# Patient Record
Sex: MALE | Race: BLACK OR AFRICAN AMERICAN | NOT HISPANIC OR LATINO | Employment: UNEMPLOYED | URBAN - NONMETROPOLITAN AREA
[De-identification: names, ages, dates, MRNs, and addresses within clinical notes are randomized per-mention and may not be internally consistent; named-entity substitution may affect disease eponyms.]

---

## 2019-10-18 ENCOUNTER — HOSPITAL ENCOUNTER (INPATIENT)
Facility: HOSPITAL | Age: 29
LOS: 1 days | Discharge: HOME | End: 2019-10-20
Attending: INTERNAL MEDICINE | Admitting: FAMILY MEDICINE

## 2019-10-18 DIAGNOSIS — R41.82 ALTERED MENTAL STATUS, UNSPECIFIED ALTERED MENTAL STATUS TYPE: Primary | ICD-10-CM

## 2019-10-18 DIAGNOSIS — R13.10 DYSPHAGIA, UNSPECIFIED TYPE: ICD-10-CM

## 2019-10-18 DIAGNOSIS — M62.82 NON-TRAUMATIC RHABDOMYOLYSIS: ICD-10-CM

## 2019-10-18 LAB
ALBUMIN SERPL-MCNC: 4.4 G/DL (ref 3.5–5.2)
ALBUMIN/GLOB SERPL: 1.8 G/DL
ALP SERPL-CCNC: 47 U/L (ref 39–117)
ALT SERPL W P-5'-P-CCNC: 18 U/L (ref 1–41)
AMPHET+METHAMPHET UR QL: NEGATIVE
AMPHETAMINES UR QL: NEGATIVE
ANION GAP SERPL CALCULATED.3IONS-SCNC: 18 MMOL/L (ref 5–15)
ARTERIAL PATENCY WRIST A: POSITIVE
AST SERPL-CCNC: 36 U/L (ref 1–40)
ATMOSPHERIC PRESS: 748 MMHG
BARBITURATES UR QL SCN: NEGATIVE
BASE EXCESS BLDA CALC-SCNC: -1.3 MMOL/L (ref 0–2)
BASOPHILS # BLD AUTO: 0.03 10*3/MM3 (ref 0–0.2)
BASOPHILS NFR BLD AUTO: 0.5 % (ref 0–1.5)
BDY SITE: ABNORMAL
BENZODIAZ UR QL SCN: NEGATIVE
BILIRUB SERPL-MCNC: 0.5 MG/DL (ref 0.2–1.2)
BILIRUB UR QL STRIP: NEGATIVE
BODY TEMPERATURE: 37 C
BUN BLD-MCNC: 15 MG/DL (ref 6–20)
BUN/CREAT SERPL: 10.1 (ref 7–25)
BUPRENORPHINE SERPL-MCNC: NEGATIVE NG/ML
CALCIUM SPEC-SCNC: 8.6 MG/DL (ref 8.6–10.5)
CANNABINOIDS SERPL QL: NEGATIVE
CHLORIDE SERPL-SCNC: 100 MMOL/L (ref 98–107)
CK SERPL-CCNC: 1606 U/L (ref 20–200)
CLARITY UR: CLEAR
CO2 SERPL-SCNC: 23 MMOL/L (ref 22–29)
COCAINE UR QL: NEGATIVE
COLOR UR: YELLOW
CREAT BLD-MCNC: 1.49 MG/DL (ref 0.76–1.27)
DEPRECATED RDW RBC AUTO: 36.5 FL (ref 37–54)
EOSINOPHIL # BLD AUTO: 0.17 10*3/MM3 (ref 0–0.4)
EOSINOPHIL NFR BLD AUTO: 2.6 % (ref 0.3–6.2)
ERYTHROCYTE [DISTWIDTH] IN BLOOD BY AUTOMATED COUNT: 12.9 % (ref 12.3–15.4)
ETHANOL UR QL: <0.01 %
GAS FLOW AIRWAY: 15 LPM
GFR SERPL CREATININE-BSD FRML MDRD: 69 ML/MIN/1.73
GLOBULIN UR ELPH-MCNC: 2.5 GM/DL
GLUCOSE BLD-MCNC: 124 MG/DL (ref 65–99)
GLUCOSE UR STRIP-MCNC: NEGATIVE MG/DL
HCO3 BLDA-SCNC: 24.6 MMOL/L (ref 20–26)
HCT VFR BLD AUTO: 35.6 % (ref 37.5–51)
HGB BLD-MCNC: 12.5 G/DL (ref 13–17.7)
HGB UR QL STRIP.AUTO: NEGATIVE
IMM GRANULOCYTES # BLD AUTO: 0.02 10*3/MM3 (ref 0–0.05)
IMM GRANULOCYTES NFR BLD AUTO: 0.3 % (ref 0–0.5)
KETONES UR QL STRIP: NEGATIVE
LEUKOCYTE ESTERASE UR QL STRIP.AUTO: NEGATIVE
LYMPHOCYTES # BLD AUTO: 2.07 10*3/MM3 (ref 0.7–3.1)
LYMPHOCYTES NFR BLD AUTO: 31.6 % (ref 19.6–45.3)
Lab: ABNORMAL
MCH RBC QN AUTO: 27.8 PG (ref 26.6–33)
MCHC RBC AUTO-ENTMCNC: 35.1 G/DL (ref 31.5–35.7)
MCV RBC AUTO: 79.1 FL (ref 79–97)
METHADONE UR QL SCN: NEGATIVE
MODALITY: ABNORMAL
MONOCYTES # BLD AUTO: 0.44 10*3/MM3 (ref 0.1–0.9)
MONOCYTES NFR BLD AUTO: 6.7 % (ref 5–12)
NEUTROPHILS # BLD AUTO: 3.82 10*3/MM3 (ref 1.7–7)
NEUTROPHILS NFR BLD AUTO: 58.3 % (ref 42.7–76)
NITRITE UR QL STRIP: NEGATIVE
NRBC BLD AUTO-RTO: 0 /100 WBC (ref 0–0.2)
OPIATES UR QL: NEGATIVE
OXYCODONE UR QL SCN: NEGATIVE
PCO2 BLDA: 44.4 MM HG (ref 35–45)
PCP UR QL SCN: NEGATIVE
PH BLDA: 7.35 PH UNITS (ref 7.35–7.45)
PH UR STRIP.AUTO: <=5 [PH] (ref 5–8)
PLATELET # BLD AUTO: 218 10*3/MM3 (ref 140–450)
PMV BLD AUTO: 10.8 FL (ref 6–12)
PO2 BLDA: 391 MM HG (ref 83–108)
POTASSIUM BLD-SCNC: 3.7 MMOL/L (ref 3.5–5.2)
PROPOXYPH UR QL: NEGATIVE
PROT SERPL-MCNC: 6.9 G/DL (ref 6–8.5)
PROT UR QL STRIP: NEGATIVE
RBC # BLD AUTO: 4.5 10*6/MM3 (ref 4.14–5.8)
SAO2 % BLDCOA: >100.1 % (ref 94–99)
SODIUM BLD-SCNC: 141 MMOL/L (ref 136–145)
SP GR UR STRIP: 1.01 (ref 1–1.03)
TRICYCLICS UR QL SCN: NEGATIVE
UROBILINOGEN UR QL STRIP: NORMAL
VENTILATOR MODE: ABNORMAL
WBC NRBC COR # BLD: 6.55 10*3/MM3 (ref 3.4–10.8)

## 2019-10-18 PROCEDURE — 80053 COMPREHEN METABOLIC PANEL: CPT | Performed by: INTERNAL MEDICINE

## 2019-10-18 PROCEDURE — 81003 URINALYSIS AUTO W/O SCOPE: CPT | Performed by: INTERNAL MEDICINE

## 2019-10-18 PROCEDURE — 94799 UNLISTED PULMONARY SVC/PX: CPT

## 2019-10-18 PROCEDURE — 82550 ASSAY OF CK (CPK): CPT | Performed by: INTERNAL MEDICINE

## 2019-10-18 PROCEDURE — 80307 DRUG TEST PRSMV CHEM ANLYZR: CPT | Performed by: INTERNAL MEDICINE

## 2019-10-18 PROCEDURE — 25010000002 PROPOFOL 10 MG/ML EMULSION: Performed by: INTERNAL MEDICINE

## 2019-10-18 PROCEDURE — 93005 ELECTROCARDIOGRAM TRACING: CPT | Performed by: INTERNAL MEDICINE

## 2019-10-18 PROCEDURE — 94770: CPT

## 2019-10-18 PROCEDURE — 36600 WITHDRAWAL OF ARTERIAL BLOOD: CPT

## 2019-10-18 PROCEDURE — 93010 ELECTROCARDIOGRAM REPORT: CPT | Performed by: INTERNAL MEDICINE

## 2019-10-18 PROCEDURE — 82803 BLOOD GASES ANY COMBINATION: CPT

## 2019-10-18 PROCEDURE — 85025 COMPLETE CBC W/AUTO DIFF WBC: CPT | Performed by: INTERNAL MEDICINE

## 2019-10-18 PROCEDURE — 99285 EMERGENCY DEPT VISIT HI MDM: CPT

## 2019-10-18 RX ADMIN — PROPOFOL 10 MCG/KG/MIN: 10 INJECTION, EMULSION INTRAVENOUS at 23:46

## 2019-10-18 RX ADMIN — SODIUM CHLORIDE 1000 ML: 9 INJECTION, SOLUTION INTRAVENOUS at 23:23

## 2019-10-18 RX ADMIN — PROPOFOL 5 MCG/KG/MIN: 10 INJECTION, EMULSION INTRAVENOUS at 23:22

## 2019-10-19 ENCOUNTER — APPOINTMENT (OUTPATIENT)
Dept: CT IMAGING | Facility: HOSPITAL | Age: 29
End: 2019-10-19

## 2019-10-19 PROBLEM — T50.901A DRUG OVERDOSE: Status: ACTIVE | Noted: 2019-10-19

## 2019-10-19 PROBLEM — R41.82 ALTERED MENTAL STATUS: Status: ACTIVE | Noted: 2019-10-19

## 2019-10-19 PROBLEM — M62.82 NON-TRAUMATIC RHABDOMYOLYSIS: Status: ACTIVE | Noted: 2019-10-19

## 2019-10-19 LAB
ANION GAP SERPL CALCULATED.3IONS-SCNC: 12 MMOL/L (ref 5–15)
APAP SERPL-MCNC: <5 MCG/ML (ref 10–30)
BUN BLD-MCNC: 11 MG/DL (ref 6–20)
BUN/CREAT SERPL: 8.7 (ref 7–25)
CALCIUM SPEC-SCNC: 8.9 MG/DL (ref 8.6–10.5)
CHLORIDE SERPL-SCNC: 103 MMOL/L (ref 98–107)
CHOLEST SERPL-MCNC: 134 MG/DL (ref 0–200)
CK SERPL-CCNC: 1905 U/L (ref 20–200)
CK SERPL-CCNC: 2654 U/L (ref 20–200)
CO2 SERPL-SCNC: 29 MMOL/L (ref 22–29)
CREAT BLD-MCNC: 1.26 MG/DL (ref 0.76–1.27)
GFR SERPL CREATININE-BSD FRML MDRD: 83 ML/MIN/1.73
GLUCOSE BLD-MCNC: 116 MG/DL (ref 65–99)
HDLC SERPL-MCNC: 61 MG/DL (ref 40–60)
HOLD SPECIMEN: NORMAL
LDLC SERPL CALC-MCNC: 65 MG/DL (ref 0–100)
LDLC/HDLC SERPL: 1.07 {RATIO}
POTASSIUM BLD-SCNC: 3.7 MMOL/L (ref 3.5–5.2)
SALICYLATES SERPL-MCNC: <0.3 MG/DL
SODIUM BLD-SCNC: 144 MMOL/L (ref 136–145)
TRIGL SERPL-MCNC: 39 MG/DL (ref 0–150)
VLDLC SERPL-MCNC: 7.8 MG/DL
WHOLE BLOOD HOLD SPECIMEN: NORMAL
WHOLE BLOOD HOLD SPECIMEN: NORMAL

## 2019-10-19 PROCEDURE — 80048 BASIC METABOLIC PNL TOTAL CA: CPT | Performed by: FAMILY MEDICINE

## 2019-10-19 PROCEDURE — 82550 ASSAY OF CK (CPK): CPT | Performed by: INTERNAL MEDICINE

## 2019-10-19 PROCEDURE — 80307 DRUG TEST PRSMV CHEM ANLYZR: CPT | Performed by: FAMILY MEDICINE

## 2019-10-19 PROCEDURE — 25010000002 PROPOFOL 1000 MG/ML EMULSION: Performed by: INTERNAL MEDICINE

## 2019-10-19 PROCEDURE — 82550 ASSAY OF CK (CPK): CPT | Performed by: FAMILY MEDICINE

## 2019-10-19 PROCEDURE — 92610 EVALUATE SWALLOWING FUNCTION: CPT

## 2019-10-19 PROCEDURE — 80061 LIPID PANEL: CPT | Performed by: FAMILY MEDICINE

## 2019-10-19 PROCEDURE — 94799 UNLISTED PULMONARY SVC/PX: CPT

## 2019-10-19 PROCEDURE — 25010000002 PROPOFOL 10 MG/ML EMULSION: Performed by: INTERNAL MEDICINE

## 2019-10-19 PROCEDURE — 70450 CT HEAD/BRAIN W/O DYE: CPT

## 2019-10-19 RX ORDER — ONDANSETRON 2 MG/ML
4 INJECTION INTRAMUSCULAR; INTRAVENOUS EVERY 6 HOURS PRN
Status: DISCONTINUED | OUTPATIENT
Start: 2019-10-19 | End: 2019-10-20 | Stop reason: HOSPADM

## 2019-10-19 RX ORDER — LORAZEPAM 2 MG/ML
1 INJECTION INTRAMUSCULAR EVERY 4 HOURS PRN
Status: DISCONTINUED | OUTPATIENT
Start: 2019-10-19 | End: 2019-10-20 | Stop reason: HOSPADM

## 2019-10-19 RX ORDER — SODIUM CHLORIDE 0.9 % (FLUSH) 0.9 %
10 SYRINGE (ML) INJECTION AS NEEDED
Status: DISCONTINUED | OUTPATIENT
Start: 2019-10-19 | End: 2019-10-20 | Stop reason: HOSPADM

## 2019-10-19 RX ORDER — SODIUM CHLORIDE 0.9 % (FLUSH) 0.9 %
10 SYRINGE (ML) INJECTION EVERY 12 HOURS SCHEDULED
Status: DISCONTINUED | OUTPATIENT
Start: 2019-10-19 | End: 2019-10-20 | Stop reason: HOSPADM

## 2019-10-19 RX ORDER — ACETAMINOPHEN 325 MG/1
650 TABLET ORAL EVERY 6 HOURS PRN
Status: DISCONTINUED | OUTPATIENT
Start: 2019-10-19 | End: 2019-10-20 | Stop reason: HOSPADM

## 2019-10-19 RX ADMIN — SODIUM CHLORIDE 1000 ML: 9 INJECTION, SOLUTION INTRAVENOUS at 01:01

## 2019-10-19 RX ADMIN — SODIUM BICARBONATE: 84 INJECTION, SOLUTION INTRAVENOUS at 10:51

## 2019-10-19 RX ADMIN — SODIUM CHLORIDE, PRESERVATIVE FREE 10 ML: 5 INJECTION INTRAVENOUS at 10:00

## 2019-10-19 RX ADMIN — ACETAMINOPHEN 650 MG: 325 TABLET, FILM COATED ORAL at 20:54

## 2019-10-19 RX ADMIN — SODIUM CHLORIDE 1000 ML: 9 INJECTION, SOLUTION INTRAVENOUS at 00:20

## 2019-10-19 RX ADMIN — SODIUM CHLORIDE 1000 ML: 9 INJECTION, SOLUTION INTRAVENOUS at 01:45

## 2019-10-19 RX ADMIN — SODIUM BICARBONATE: 84 INJECTION, SOLUTION INTRAVENOUS at 21:02

## 2019-10-19 RX ADMIN — PROPOFOL 45 MCG/KG/MIN: 10 INJECTION, EMULSION INTRAVENOUS at 06:14

## 2019-10-19 RX ADMIN — SODIUM CHLORIDE 1000 ML: 9 INJECTION, SOLUTION INTRAVENOUS at 05:02

## 2019-10-19 RX ADMIN — PROPOFOL 50 MCG/KG/MIN: 10 INJECTION, EMULSION INTRAVENOUS at 03:45

## 2019-10-19 RX ADMIN — SODIUM CHLORIDE 1000 ML: 9 INJECTION, SOLUTION INTRAVENOUS at 03:50

## 2019-10-19 RX ADMIN — SODIUM CHLORIDE, PRESERVATIVE FREE 10 ML: 5 INJECTION INTRAVENOUS at 20:20

## 2019-10-19 NOTE — PLAN OF CARE
Problem: Patient Care Overview  Goal: Plan of Care Review  Outcome: Ongoing (interventions implemented as appropriate)   10/19/19 4900   Coping/Psychosocial   Plan of Care Reviewed With patient   Plan of Care Review   Progress improving   OTHER   Outcome Summary TRANSER FROM UNIT, DRUG OD AND RHABDOMYLOSIS. 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  [place]  AT BEDSIDE. BICARB RUNNING @ 150. AWAITING FOR CK TO IMPROVE TO BE DISCHARGED. WILL DISCHARGE WITH [place] POLICE. SAFETY MAINTAINED, NO FALLS. WILL CONT TO MONITOR AND NOTIFY MD OF ANY CHANGES.

## 2019-10-19 NOTE — ED PROVIDER NOTES
Subjective   History of Present Illness    Review of Systems    Past Medical History:   Diagnosis Date   • ADHD    • Depression    • PTSD (post-traumatic stress disorder)        No Known Allergies    History reviewed. No pertinent surgical history.    History reviewed. No pertinent family history.    Social History     Socioeconomic History   • Marital status: Single     Spouse name: Not on file   • Number of children: Not on file   • Years of education: Not on file   • Highest education level: Not on file   Tobacco Use   • Smoking status: Current Some Day Smoker     Types: Cigarettes   Substance and Sexual Activity   • Alcohol use: Yes     Comment: OCCASIONAL   • Drug use: No   • Sexual activity: Defer           Objective   Physical Exam    Procedures           ED Course  ED Course as of Oct 19 0838   Sat Oct 19, 2019   0836 I took over from Dr. Albright at shift change.  Patient is continued to be sedated here in the emergency room.  His CK has risen instead of going down after treatment.  I have spoke with Dr. Meneses and we will admit.  [TR]      ED Course User Index  [TR] Ruy Isaac Jr., MD                  MDM  Number of Diagnoses or Management Options  Altered mental status, unspecified altered mental status type: new and requires workup  Non-traumatic rhabdomyolysis: new and requires workup     Amount and/or Complexity of Data Reviewed  Clinical lab tests: reviewed and ordered  Tests in the radiology section of CPT®: reviewed and ordered  Tests in the medicine section of CPT®: reviewed and ordered  Discuss the patient with other providers: yes  Independent visualization of images, tracings, or specimens: yes    Risk of Complications, Morbidity, and/or Mortality  Presenting problems: high  Diagnostic procedures: high  Management options: high    Patient Progress  Patient progress: improved      Final diagnoses:   Altered mental status, unspecified altered mental status type   Non-traumatic  rhabdomyolysis              Ruy Isaac Jr., MD  10/19/19 4596

## 2019-10-19 NOTE — H&P
"    [facility] Medicine Services  HISTORY AND PHYSICAL    Date of Admission: 10/18/2019  Primary Care Physician: Provider, No Known    Subjective     Chief Complaint: Altered mental status    History of Present Illness  28 year old male brought to the ER from [facility] house due to agitation and mental status changes. He has charges and is currently at the disposition of law enforcement.     Per ER report, he was treated by EMS with Ketamine. Noted to remain agitated, and was started on a Propofol drip in the ER. He is not intubated.    This AM we are called to admit for persistent changes in mental status, but the patient is on a propofol drip. So it is difficult to determine if he is still altered. CT head was negative and CPK is elevated consistent with rhabdomyolysis.     He is hand cuffed and watched by .    Review of Systems   Patient is not able to provide.    Past Medical History:   Past Medical History:   Diagnosis Date   • ADHD    • Depression    • PTSD (post-traumatic stress disorder)      Past Surgical History:History reviewed. No pertinent surgical history.  Social History:  reports that he has been smoking cigarettes.  He does not have any smokeless tobacco history on file. He reports that he drinks alcohol. He reports that he does not use drugs.    Family History: family history is not on file.   Unable to obtain.    Allergies:  No Known Allergies  Medications:  Prior to Admission medications    Not on File     Objective     Vital Signs: /89   Pulse 57   Temp 98.7 °F (37.1 °C)   Resp 17   Ht 188 cm (74\")   Wt 110 kg (242 lb)   SpO2 100%   BMI 31.07 kg/m²   Physical Exam   Constitutional: He appears well-developed and well-nourished. No distress.   HENT:   Head: Normocephalic and atraumatic.   Right Ear: External ear normal.   Left Ear: External ear normal.   Eyes: Pupils are equal, round, and reactive to light. Right eye exhibits no discharge. " Left eye exhibits no discharge. No scleral icterus.   Neck: Normal range of motion. Neck supple. No JVD present. No thyromegaly present.   Cardiovascular: Normal rate, regular rhythm and normal heart sounds.   No murmur heard.  Pulmonary/Chest: Effort normal and breath sounds normal. No respiratory distress. He has no wheezes. He has no rales.   Abdominal: Soft. Bowel sounds are normal. He exhibits no distension. There is no tenderness. There is no guarding.   Musculoskeletal: Normal range of motion. He exhibits no edema or deformity.   Neurological:   Sedated on propofol. Moves extremities symmetrically, moans.    Skin: Skin is warm. Capillary refill takes less than 2 seconds. He is not diaphoretic. No erythema.     Results Reviewed:  Lab Results (last 24 hours)     Procedure Component Value Units Date/Time    CK [[id number]]  (Abnormal) Collected:  10/19/19 0512    Specimen:  Blood Updated:  10/19/19 0538     Creatine Kinase 1,905 U/L     [place] Draw [[id number]] Collected:  10/18/19 2305    Specimen:  Blood Updated:  10/19/19 0015    Narrative:       The following orders were created for panel order [place] Draw.  Procedure                               Abnormality         Status                     ---------                               -----------         ------                     Light Blue Top[[id number]]                                   Final result               Green Top (Gel)[[id number]]                                  Final result               Lavender Top[[id number]]                                     Final result               Red Top[[id number]]                                          In process                   Please view results for these tests on the individual orders.    Light Blue Top [[id number]] Collected:  10/18/19 2305    Specimen:  Blood Updated:  10/19/19 0015     Extra Tube hold for add-on     Comment: Auto resulted       Green Top (Gel) [[id number]] Collected:  10/18/19 2305     Specimen:  Blood Updated:  10/19/19 0015     Extra Tube Hold for add-ons.     Comment: Auto resulted.       Lavender Top [[id number]] Collected:  10/18/19 2305    Specimen:  Blood Updated:  10/19/19 0015     Extra Tube hold for add-on     Comment: Auto resulted       Comprehensive Metabolic Panel [[id number]]  (Abnormal) Collected:  10/18/19 2305    Specimen:  Blood Updated:  10/18/19 2332     Glucose 124 mg/dL      BUN 15 mg/dL      Creatinine 1.49 mg/dL      Sodium 141 mmol/L      Potassium 3.7 mmol/L      Chloride 100 mmol/L      CO2 23.0 mmol/L      Calcium 8.6 mg/dL      Total Protein 6.9 g/dL      Albumin 4.40 g/dL      ALT (SGPT) 18 U/L      AST (SGOT) 36 U/L      Alkaline Phosphatase 47 U/L      Total Bilirubin 0.5 mg/dL      eGFR  African Amer 69 mL/min/1.73      Globulin 2.5 gm/dL      A/G Ratio 1.8 g/dL      BUN/Creatinine Ratio 10.1     Anion Gap 18.0 mmol/L     Narrative:       GFR Normal >60  Chronic Kidney Disease <60  Kidney Failure <15    CK [[id number]]  (Abnormal) Collected:  10/18/19 2305    Specimen:  Blood Updated:  10/18/19 2332     Creatine Kinase 1,606 U/L     Ethanol [[id number]] Collected:  10/18/19 2305    Specimen:  Blood Updated:  10/18/19 2332     Ethanol % <0.010 %     Narrative:       Not for legal purposes. Chain of Custody not followed.     Urine Drug Screen - Urine, Clean Catch [[id number]]  (Normal) Collected:  10/18/19 2310    Specimen:  Urine, Clean Catch Updated:  10/18/19 2330     THC, Screen, Urine Negative     Phencyclidine (PCP), Urine Negative     Cocaine Screen, Urine Negative     Methamphetamine, Ur Negative     Opiate Screen Negative     Amphetamine Screen, Urine Negative     Benzodiazepine Screen, Urine Negative     Tricyclic Antidepressants Screen Negative     Methadone Screen, Urine Negative     Barbiturates Screen, Urine Negative     Oxycodone Screen, Urine Negative     Propoxyphene Screen Negative     Buprenorphine, Screen, Urine Negative    Narrative:       Cutoff For  Drugs Screened:    Amphetamines               500 ng/ml  Barbiturates               200 ng/ml  Benzodiazepines            150 ng/ml  Cocaine                    150 ng/ml  Methadone                  200 ng/ml  Opiates                    100 ng/ml  Phencyclidine               25 ng/ml  THC                            50 ng/ml  Methamphetamine            500 ng/ml  Tricyclic Antidepressants  300 ng/ml  Oxycodone                  100 ng/ml  Propoxyphene               300 ng/ml  Buprenorphine               10 ng/ml    The normal value for all drugs tested is negative. This report includes unconfirmed screening results, with the cutoff values listed, to be used for medical treatment purposes only.  Unconfirmed results must not be used for non-medical purposes such as employment or legal testing.  Clinical consideration should be applied to any drug of abuse test, particularly when unconfirmed results are used.      Urinalysis With Microscopic If Indicated (No Culture) - Urine, Clean Catch [[id number]]  (Normal) Collected:  10/18/19 2310    Specimen:  Urine, Clean Catch Updated:  10/18/19 2317     Color, UA Yellow     Appearance, UA Clear     pH, UA <=5.0     Specific Gravity, UA 1.008     Glucose, UA Negative     Ketones, UA Negative     Bilirubin, UA Negative     Blood, UA Negative     Protein, UA Negative     Leuk Esterase, UA Negative     Nitrite, UA Negative     Urobilinogen, UA 0.2 E.U./dL    Narrative:       Urine microscopic not indicated.    Blood Gas, Arterial [[id number]]  (Abnormal) Collected:  10/18/19 2315    Specimen:  Arterial Blood Updated:  10/18/19 2317     Site Right Radial     Artem's Test Positive     pH, Arterial 7.351 pH units      pCO2, Arterial 44.4 mm Hg      pO2, Arterial 391.0 mm Hg      Comment: 83 Value above reference range        HCO3, Arterial 24.6 mmol/L      Base Excess, Arterial -1.3 mmol/L      Comment: 84 Value below reference range        O2 Saturation, Arterial >100.1 %      Comment:  93 Value above reportable range > 100.1        Temperature 37.0 C      Barometric Pressure for Blood Gas 748 mmHg      Modality NRB     Flow Rate 15.0 lpm      Ventilator Mode NA     Collected by [id number]     Comment: Meter: [id number]     :  [id number]       CBC & Differential [[id number]] Collected:  10/18/19 2305    Specimen:  Blood Updated:  10/18/19 2317    Narrative:       The following orders were created for panel order CBC & Differential.  Procedure                               Abnormality         Status                     ---------                               -----------         ------                     CBC Auto Differential[[id number]]        Abnormal            Final result                 Please view results for these tests on the individual orders.    CBC Auto Differential [[id number]]  (Abnormal) Collected:  10/18/19 2305    Specimen:  Blood Updated:  10/18/19 2317     WBC 6.55 10*3/mm3      RBC 4.50 10*6/mm3      Hemoglobin 12.5 g/dL      Hematocrit 35.6 %      MCV 79.1 fL      MCH 27.8 pg      MCHC 35.1 g/dL      RDW 12.9 %      RDW-SD 36.5 fl      MPV 10.8 fL      Platelets 218 10*3/mm3      Neutrophil % 58.3 %      Lymphocyte % 31.6 %      Monocyte % 6.7 %      Eosinophil % 2.6 %      Basophil % 0.5 %      Immature Grans % 0.3 %      Neutrophils, Absolute 3.82 10*3/mm3      Lymphocytes, Absolute 2.07 10*3/mm3      Monocytes, Absolute 0.44 10*3/mm3      Eosinophils, Absolute 0.17 10*3/mm3      Basophils, Absolute 0.03 10*3/mm3      Immature Grans, Absolute 0.02 10*3/mm3      nRBC 0.0 /100 WBC     Red Top [[id number]] Collected:  10/18/19 2306    Specimen:  Blood Updated:  10/18/19 2313        Imaging Results (last 24 hours)     Procedure Component Value Units Date/Time    CT Head Without Contrast [[id number]] Collected:  10/19/19 0657     Updated:  10/19/19 0701    Narrative:       EXAMINATION:   CT HEAD WO CONTRAST-  10/19/2019 6:57 AM CDT     HISTORY: CT BRAIN without and with contrast  10/19/2019 12:42 AM CDT     HISTORY: Confusion altered level of consciousness     COMPARISON: NONE      DLP: 736 mGy cm     TECHNIQUE: Serial axial tomographic images of the head were obtained  from the skull base through the vertex without intravenous contrast.  Following the uneventful administration of intravenous contrast, serial  helical tomographic images of the brain were obtained. Bone and brain  algorithms were provided.      FINDINGS:   The gray-white matter differentiation of the brain is normal in  appearance. The ventricular system, sulcation pattern, and basilar  cisterns are unremarkable. No midline shift, mass-effect, or evidence  for herniation is demonstrated. No hyperdense blood products are  present, and there are no abnormal extra-axial fluid collections. No  abnormal areas of enhancement are seen. The osseous calvarium is intact.        The surrounding soft tissues are unremarkable. The paranasal sinuses and  bilateral mastoid air cells are clear. The imaged portions of the  bilateral globes and orbits are unremarkable.        Impression:       1. No acute intracranial process is present.         This report was finalized on 10/19/2019 06:58 by Dr. Dimitri Cummings MD.        I have personally reviewed and interpreted the radiology studies and ECG obtained at time of admission. No QT prolongation, sinus rhythm.     Assessment / Plan     Assessment:   Active Hospital Problems    Diagnosis   • **Drug overdose   • Non-traumatic rhabdomyolysis   Presumed drug effect and altered mental status    Plan:   Stop Propofol and use benzodiazepines or antipsychotics as needed if underlying agitation persists.   IVF to D5W sodium bicarbonate drip  Follow labs  Npo until alert and reassessed      Code Status: Full       Estimated length of stay to be determined. With synthetic drugs it is impossible to determined the duration of effects and cases can range from hours to days.     Darshan Gao MD    10/19/19   8:15 AM

## 2019-10-19 NOTE — PLAN OF CARE
Problem: Patient Care Overview  Goal: Plan of Care Review  Outcome: Ongoing (interventions implemented as appropriate)   10/19/19 1561   Coping/Psychosocial   Plan of Care Reviewed With patient;other (see comments)  (Present officer and RN, Na.)   Plan of Care Review   Progress (Eval)   OTHER   Outcome Summary Bedside swallow eval completed. Oral musculature fx was WNL. Pt was presented a full range of consistencies. Pt was noted to have decreased labial resistance on spoon and straw, though functional. No concerns with oral prep or oral transit. No noted concerns with laryngeal elevation. Rotary chew WNL with regular solid, without oral residue. No overt s/s of aspiration were observed. Cannot fully r/o aspiration at this time, yet feel oropharyngeal swallow fx is WNL. RECOMMENDATIONS: D/C NPO status, ok to start regular solid diet consistency diet with regular/thin liquids; general feeding/aspiration precautions; meds whole with thin liquids; RN to monitor for increased congestion. Continue ST services are not felt warranted at this time. MD to reconsult if changes or new concerns arise.

## 2019-10-19 NOTE — ED PROVIDER NOTES
"Subjective   Mr. Brown is a 28-year-old black male who was found to have altered mental status.  He was quite agitated and confused.  He was currently in the course of being arrested by police.  When he became quite combative.  Due to his altered mental state EMS was called who had to give him ketamine for a total of 400 mg 2 assist in the safety of transfer.  He is now in the ER somnolescent.            Review of Systems   Unable to perform ROS: Patient unresponsive       Past Medical History:   Diagnosis Date   • ADHD    • Depression    • PTSD (post-traumatic stress disorder)        No Known Allergies    History reviewed. No pertinent surgical history.    History reviewed. No pertinent family history.    Social History     Socioeconomic History   • Marital status: Single     Spouse name: Not on file   • Number of children: Not on file   • Years of education: Not on file   • Highest education level: Not on file   Tobacco Use   • Smoking status: Current Some Day Smoker     Types: Cigarettes   Substance and Sexual Activity   • Alcohol use: Yes     Comment: OCCASIONAL   • Drug use: No   • Sexual activity: Defer       Prior to Admission medications    Not on File       /67 (BP Location: Right arm, Patient Position: Lying)   Pulse 64   Temp 98.3 °F (36.8 °C) (Oral)   Resp 18   Ht 182.9 cm (72\")   Wt 102 kg (225 lb 3.2 oz)   SpO2 99%   BMI 30.54 kg/m²     Objective   Physical Exam   Constitutional: He appears well-developed and well-nourished.   HENT:   Head: Normocephalic and atraumatic.   Mouth/Throat: Oropharynx is clear and moist.   Eyes: EOM are normal. Pupils are equal, round, and reactive to light.   Neck: Normal range of motion. Neck supple.   Cardiovascular: Normal rate, regular rhythm, normal heart sounds and intact distal pulses.   Pulmonary/Chest: Effort normal and breath sounds normal.   Abdominal: Soft. Bowel sounds are normal. There is no tenderness.   Musculoskeletal: Normal range of " motion. He exhibits no edema.   Neurological: He has normal reflexes. He is unresponsive. No cranial nerve deficit.   Skin: Skin is warm and dry. No rash noted.   Psychiatric: He has a normal mood and affect. His behavior is normal. Thought content normal.   Nursing note and vitals reviewed.      ECG 12 Lead    Date/Time: 10/18/2019 11:38 PM  Performed by: Johnathan Albright MD  Authorized by: Johnathan Albright MD   Interpreted by physician  Rhythm comments: EKG shows sinus mechanism tachycardic at 126.  No evidence of acute ST-T wave changes.                 Lab Results (last 24 hours)     ** No results found for the last 24 hours. **          SCANNED - IMAGING   Final Result      CT Head Without Contrast   ED Interpretation   CT the head per stat read shows no acute intracranial process no hemorrhage, hydrocephalus, mass-effect or herniation.      Final Result   1. No acute intracranial process is present.            This report was finalized on 10/19/2019 06:58 by Dr. Dimitri Cummings MD.          ED Course  ED Course as of Oct 20 1749   Sat Oct 19, 2019   0836 I took over from Dr. Albright at shift change.  Patient is continued to be sedated here in the emergency room.  His CK has risen instead of going down after treatment.  I have spoke with Dr. Meneses and we will admit.  [TR]      ED Course User Index  [TR] Ruy Isaac Jr., MD          [facility]    Final diagnoses:   Altered mental status, unspecified altered mental status type   Non-traumatic rhabdomyolysis        Johnathan Albright MD  10/19/19 0147       Johnathan Albright MD  10/20/19 1749

## 2019-10-19 NOTE — THERAPY DISCHARGE NOTE
Acute Care - Speech Language Pathology   Swallow Eval/Discharge [facility]     Patient Name: Epifanio Brown  : 1990  MRN: [id number]  Today's Date: 10/19/2019               Admit Date: 10/18/2019     Bedside swallow eval completed. Oral musculature fx was WNL. Pt was presented a full range of consistencies. Pt was noted to have decreased labial resistance on spoon and straw, though functional. No concerns with oral prep or oral transit. No noted concerns with laryngeal elevation. Rotary chew WNL with regular solid, without oral residue. No overt s/s of aspiration were observed. Cannot fully r/o aspiration at this time, yet feel oropharyngeal swallow fx is WNL. RECOMMENDATIONS: D/C NPO status, ok to start regular solid diet consistency diet with regular/thin liquids; general feeding/aspiration precautions; meds whole with thin liquids; RN to monitor for increased congestion. Continue ST services are not felt warranted at this time. MD to reconsult if changes or new concerns arise.   Carla Virk, CCC-SLP 10/19/2019 1:35 PM    Visit Dx:    ICD-10-CM ICD-9-CM   1. Altered mental status, unspecified altered mental status type R41.82 780.97   2. Non-traumatic rhabdomyolysis M62.82 728.88   3. Dysphagia, unspecified type R13.10 787.20     Patient Active Problem List   Diagnosis   • Drug overdose   • Non-traumatic rhabdomyolysis   • Altered mental status     Past Medical History:   Diagnosis Date   • ADHD    • Depression    • PTSD (post-traumatic stress disorder)      History reviewed. No pertinent surgical history.       SWALLOW EVALUATION (last 72 hours)      SLP Adult Swallow Evaluation     Row Name 10/19/19 1215                   Rehab Evaluation    Document Type  evaluation  -TM        Subjective Information  no complaints  -        Patient Observations  alert;cooperative  -        Patient/Family Observations  [place]  present at time of eval. Pt in bilateral soft wrist restraints.   -         Patient Effort  adequate  -TM           General Information    Patient Profile Reviewed  yes  -TM        Pertinent History Of Current Problem  Acute mental status change during arrest by police. Hx of ADHD, PTSD, depression, currently resides in custodial house.   -TM        Current Method of Nutrition  NPO  -TM        Precautions/Limitations, Vision  WFL  -TM        Precautions/Limitations, Hearing  WFL  -TM        Prior Level of Function-Communication  WFL  -TM        Prior Level of Function-Swallowing  no diet consistency restrictions  -TM        Plans/Goals Discussed with  patient;other (see comments) RN, officer, and RNNa  -TM        Barriers to Rehab  none identified  -TM        Patient's Goals for Discharge  return to PO diet  -TM           Pain Assessment    Additional Documentation  Pain Scale: Numbers Pre/Post-Treatment (Group)  -TM           Pain Scale: Numbers Pre/Post-Treatment    Pain Scale: Numbers, Pretreatment  0/10 - no pain  -TM        Pain Scale: Numbers, Post-Treatment  0/10 - no pain  -TM        Pre/Post Treatment Pain Comment  Though pt did report bilateral upper extremity tingling and edema in hands.  -TM           Oral Motor and Function    Dentition Assessment  natural, present and adequate  -TM        Secretion Management  WNL/WFL  -TM        Mucosal Quality  moist, healthy  -TM           Oral Musculature and Cranial Nerve Assessment    Oral Motor General Assessment  WFL  -TM           General Eating/Swallowing Observations    Respiratory Support Currently in Use  nasal cannula;other (see comments) with CO2 meter   -TM        Eating/Swallowing Skills  fed by SLP  -TM        Positioning During Eating  upright in bed  -TM        Utensils Used  spoon;straw  -TM        Consistencies Trialed  pudding thick;honey-thick liquids;nectar/syrup-thick liquids;thin liquids;regular textures  -TM           Respiratory    Respiratory Status  WFL  -TM           Clinical Swallow Eval    Oral Prep  Phase  WFL  -TM        Oral Transit  WFL  -TM        Oral Residue  WFL  -TM        Pharyngeal Phase  WFL  -TM        Esophageal Phase  unremarkable  -TM        Clinical Swallow Evaluation Summary  Bedside swallow eval completed. Oral musculature fx was WNL. Pt was presented a full range of consistencies. Pt was noted to have decreased labial resistance on spoon and straw, though functional. No concerns with oral prep or oral transit. No noted concerns with laryngeal elevation. Rotary chew WNL with regular solid, without oral residue. No overt s/s of aspiration were observed. Cannot fully r/o aspiration at this time, yet feel oropharyngeal swallow fx is WNL.  -TM           Clinical Impression    SLP Swallowing Diagnosis  functional oral phase;functional pharyngeal phase  -TM        Functional Impact  no impact on function  -TM        Swallow Criteria for Skilled Therapeutic Interventions Met  baseline status;no problems identified which require skilled intervention  -TM           Recommendations    Therapy Frequency (Swallow)  evaluation only  -TM        SLP Diet Recommendation  regular textures;thin liquids  -TM        Recommended Precautions and Strategies  upright posture during/after eating;small bites of food and sips of liquid  -TM        SLP Rec. for Method of Medication Administration  meds whole;with thin liquids  -TM        Monitor for Signs of Aspiration  yes;cough;gurgly voice;throat clearing;pneumonia;right lower lobe infiltrates  -TM        Anticipated Dischage Disposition  other (see comments) Correctional facility  -          User Key  (r) = Recorded By, (t) = Taken By, (c) = Cosigned By    Initials Name Effective Dates     Carla Virk CCC-SLP 08/02/16 -           EDUCATION  The patient has been educated in the following areas:   Dysphagia (Swallowing Impairment).    SLP Recommendation and Plan  SLP Swallowing Diagnosis: functional oral phase, functional pharyngeal phase  SLP Diet  Recommendation: regular textures, thin liquids     Monitor for Signs of Aspiration: yes, cough, gurgly voice, throat clearing, pneumonia, right lower lobe infiltrates     Swallow Criteria for Skilled Therapeutic Interventions Met: baseline status, no problems identified which require skilled intervention  Anticipated Dischage Disposition: other (see comments)(Correctional facility)     Therapy Frequency (Swallow): evaluation only          Plan of Care Reviewed With: patient, other (see comments)(Present officer and RNNa.)  Progress: (Eval)  Outcome Summary: Bedside swallow eval completed. Oral musculature fx was WNL. Pt was presented a full range of consistencies. Pt was noted to have decreased labial resistance on spoon and straw, though functional. No concerns with oral prep or oral transit. No noted concerns with laryngeal elevation. Rotary chew WNL with regular solid, without oral residue. No overt s/s of aspiration were observed. Cannot fully r/o aspiration at this time, yet feel oropharyngeal swallow fx is WNL. RECOMMENDATIONS: D/C NPO status, ok to start regular solid diet consistency diet with regular/thin liquids; general feeding/aspiration precautions; meds whole with thin liquids; RN to monitor for increased congestion. Continue ST services are not felt warranted at this time. MD to reconsult if changes or new concerns arise.              Time Calculation:   Time Calculation- SLP     Row Name 10/19/19 1333             Time Calculation- SLP    SLP Start Time  1215  -      SLP Stop Time  1255  -      SLP Time Calculation (min)  40 min  -      SLP Received On  10/19/19  -        User Key  (r) = Recorded By, (t) = Taken By, (c) = Cosigned By    Initials Name Provider Type     Carla Virk CCC-SLP Speech and Language Pathologist          Therapy Charges for Today     Code Description Service Date Service Provider Modifiers Qty    [id number] [facility] EVAL ORAL PHARYNG SWALLOW 3 10/19/2019  Carla Virk, CCC-SLP GN 1               SLP Discharge Summary  Anticipated Dischage Disposition: other (see comments)(Correctional facility)  Reason for Discharge: other (see comments)(Eval only)  Progress Toward Achieving Short/long Term Goals: other (see comments)(Eval)  Discharge Destination: other (see comments)(Correctional facility)    Carla Virk, CCC-SLP  10/19/2019

## 2019-10-19 NOTE — SIGNIFICANT NOTE
[place] Police arrived with patient in handcuffs.  There was not a suitable secure place on the bed to attach them and they were exchanged for the hospital soft restraint cuffs and an officer to remain at bedside at all times.

## 2019-10-20 VITALS
DIASTOLIC BLOOD PRESSURE: 67 MMHG | HEIGHT: 72 IN | SYSTOLIC BLOOD PRESSURE: 138 MMHG | TEMPERATURE: 98.3 F | OXYGEN SATURATION: 99 % | RESPIRATION RATE: 18 BRPM | WEIGHT: 225.2 LBS | BODY MASS INDEX: 30.5 KG/M2 | HEART RATE: 64 BPM

## 2019-10-20 PROBLEM — T50.901A DRUG OVERDOSE: Status: RESOLVED | Noted: 2019-10-19 | Resolved: 2019-10-20

## 2019-10-20 PROBLEM — R41.82 ALTERED MENTAL STATUS: Status: RESOLVED | Noted: 2019-10-19 | Resolved: 2019-10-20

## 2019-10-20 RX ADMIN — SODIUM CHLORIDE, PRESERVATIVE FREE 10 ML: 5 INJECTION INTRAVENOUS at 10:34

## 2019-10-20 RX ADMIN — SODIUM BICARBONATE: 84 INJECTION, SOLUTION INTRAVENOUS at 04:29

## 2019-10-20 NOTE — PLAN OF CARE
Problem: Patient Care Overview  Goal: Plan of Care Review  Outcome: Ongoing (interventions implemented as appropriate)   10/20/19 1051   Coping/Psychosocial   Plan of Care Reviewed With patient   Plan of Care Review   Progress improving   OTHER   Outcome Summary Pt A&O. No c/o pain. Room air. Numbness and tingling in hands and feet. Pt pleasant and noncombative.  outside room at all times. VSS. Safety maintained. DC today.

## 2019-10-20 NOTE — PLAN OF CARE
Problem: Patient Care Overview  Goal: Plan of Care Review  Outcome: Ongoing (interventions implemented as appropriate)   10/20/19 7218   Coping/Psychosocial   Plan of Care Reviewed With patient   Plan of Care Review   Progress improving   OTHER   Outcome Summary Patient C/O LT. wrist pain and reports tingling and numbness in both hands and feet. Edema  Present in both arms. Patient soft wrist restraint switched to RT. Side. PRN Tylenol given for pain and LT. arm elevated on pillow. IV fluids with Bicarb infusing. Stable on RA. Alert and orientated x 4. Police officers at bedside. Safety maintained.       Problem: Fall Risk (Adult)  Goal: Identify Related Risk Factors and Signs and Symptoms  Outcome: Outcome(s) achieved Date Met: 10/20/19    Goal: Absence of Fall  Outcome: Ongoing (interventions implemented as appropriate)

## 2019-10-20 NOTE — DISCHARGE SUMMARY
"    [facility] Medicine Services  DISCHARGE SUMMARY       Date of Admission: 10/18/2019  Date of Discharge:  10/20/2019  Primary Care Physician: Provider, No Known    Presenting Problem/History of Present Illness:  Drug overdose [T50.901A]  Altered mental status, unspecified altered mental status type [R41.82]     Final Discharge Diagnoses:  Active Hospital Problems    Diagnosis   • **Drug overdose   • Non-traumatic rhabdomyolysis   • Altered mental status       Consults: PT/OT/SLP    History of Present Illness on Day of Discharge: Resting comfortably.     Hospital Course:  The patient is a 28 y.o. male who presented to [facility] with mental status changes and severe agitation brought to the ER by law force. He was treated with Propofol in the ER and we were called to admit in AM.    Propofol was discontinued and the patient woke up and seemed alert and oriented. Following commands without any neurological deficits. Diet was advanced and tolerated.    IVF were continued for rhabdomyolysis with improvement in creatinine. He should stay hydrated and drink plenty of water. Stable for discharge to law enforcement.       Condition on Discharge:  Stable    Physical Exam on Discharge:  /67 (BP Location: Right arm, Patient Position: Lying)   Pulse 64   Temp 98.3 °F (36.8 °C) (Oral)   Resp 18   Ht 182.9 cm (72\")   Wt 102 kg (225 lb 3.2 oz)   SpO2 99%   BMI 30.54 kg/m²   Physical Exam   Constitutional: He is oriented to person, place, and time. He appears well-developed and well-nourished.   HENT:   Head: Normocephalic and atraumatic.   Right Ear: External ear normal.   Left Ear: External ear normal.   Eyes: EOM are normal. Pupils are equal, round, and reactive to light.   Neck: Normal range of motion. Neck supple. No JVD present.   Cardiovascular: Normal rate, regular rhythm and normal heart sounds.   Pulmonary/Chest: Effort normal and breath sounds normal. No " respiratory distress. He has no wheezes. He has no rales.   Abdominal: Soft. Bowel sounds are normal. He exhibits no distension and no mass. There is no tenderness. There is no guarding.   Musculoskeletal: Normal range of motion. He exhibits no edema or deformity.   Neurological: He is alert and oriented to person, place, and time. He displays normal reflexes. No cranial nerve deficit or sensory deficit. He exhibits normal muscle tone. Coordination normal.   Skin: Skin is warm. Capillary refill takes less than 2 seconds. No erythema.   Psychiatric: He has a normal mood and affect.     Discharge Disposition:  Law enforcement    Discharge Medications:     Discharge Medications      Patient Not Prescribed Medications Upon Discharge         Discharge Diet:   Regular    Activity at Discharge:   Resume usual activity    Discharge Care Plan/Instructions: No heavy weight lifting for 1 week    Follow-up Appointments:   [facility] in 1-2 months    Test Results Pending at Discharge: None    Patient is medically cleared and can proceed to law disposition wether incarceration or release, based on his charges.    Darshan Gao MD  10/20/19  11:12 AM    Time: 22 minutes